# Patient Record
Sex: MALE | Race: BLACK OR AFRICAN AMERICAN
[De-identification: names, ages, dates, MRNs, and addresses within clinical notes are randomized per-mention and may not be internally consistent; named-entity substitution may affect disease eponyms.]

---

## 2019-11-27 ENCOUNTER — HOSPITAL ENCOUNTER (EMERGENCY)
Dept: HOSPITAL 54 - ER | Age: 64
Discharge: HOME | End: 2019-11-27
Payer: COMMERCIAL

## 2019-11-27 VITALS — DIASTOLIC BLOOD PRESSURE: 71 MMHG | SYSTOLIC BLOOD PRESSURE: 129 MMHG

## 2019-11-27 VITALS — BODY MASS INDEX: 25.46 KG/M2 | HEIGHT: 68 IN | WEIGHT: 168 LBS

## 2019-11-27 DIAGNOSIS — Z79.899: ICD-10-CM

## 2019-11-27 DIAGNOSIS — R07.89: Primary | ICD-10-CM

## 2019-11-27 DIAGNOSIS — I10: ICD-10-CM

## 2019-11-27 DIAGNOSIS — E78.5: ICD-10-CM

## 2019-11-27 DIAGNOSIS — R00.1: ICD-10-CM

## 2019-11-27 DIAGNOSIS — Z98.890: ICD-10-CM

## 2019-11-27 LAB
BASOPHILS # BLD AUTO: 0 /CMM (ref 0–0.2)
BASOPHILS NFR BLD AUTO: 0.4 % (ref 0–2)
BUN SERPL-MCNC: 14 MG/DL (ref 7–18)
CALCIUM SERPL-MCNC: 8.4 MG/DL (ref 8.5–10.1)
CHLORIDE SERPL-SCNC: 106 MMOL/L (ref 98–107)
CO2 SERPL-SCNC: 29 MMOL/L (ref 21–32)
CREAT SERPL-MCNC: 1.6 MG/DL (ref 0.6–1.3)
EOSINOPHIL NFR BLD AUTO: 4 % (ref 0–6)
GLUCOSE SERPL-MCNC: 113 MG/DL (ref 74–106)
HCT VFR BLD AUTO: 36 % (ref 39–51)
HGB BLD-MCNC: 11.7 G/DL (ref 13.5–17.5)
LYMPHOCYTES NFR BLD AUTO: 1.4 /CMM (ref 0.8–4.8)
LYMPHOCYTES NFR BLD AUTO: 37.8 % (ref 20–44)
MCHC RBC AUTO-ENTMCNC: 32 G/DL (ref 31–36)
MCV RBC AUTO: 86 FL (ref 80–96)
MONOCYTES NFR BLD AUTO: 0.4 /CMM (ref 0.1–1.3)
MONOCYTES NFR BLD AUTO: 11.9 % (ref 2–12)
NEUTROPHILS # BLD AUTO: 1.7 /CMM (ref 1.8–8.9)
NEUTROPHILS NFR BLD AUTO: 45.9 % (ref 43–81)
PLATELET # BLD AUTO: 167 /CMM (ref 150–450)
POTASSIUM SERPL-SCNC: 3.7 MMOL/L (ref 3.5–5.1)
RBC # BLD AUTO: 4.22 MIL/UL (ref 4.5–6)
SODIUM SERPL-SCNC: 143 MMOL/L (ref 136–145)
WBC NRBC COR # BLD AUTO: 3.7 K/UL (ref 4.3–11)

## 2019-11-27 NOTE — NUR
PT CAME FROM HOME WITH C/O MIDSTERNAL CHEST, FELT A NUMBNESS THAT RADIATE TO 
LEFT ARM THAT STARTED LAST NIGHT AROUND 2100. TOOK 2 81MG ASPIRIN AT HOME PTA. 
AAOX4. NO SOB. BREATHING EVEN AND UNLABORED. NAD. TROPONIN RESULTS NEGATIVE. 
CHEST XRAY TAKEN. AWAITING MD ORDERS.

## 2019-11-27 NOTE — NUR
-------------------------------------------------------------------------------

             *** Note undone in EDM - 11/27/19 at 2020 by MINO ***             

-------------------------------------------------------------------------------

REDRAWN TROPONIN LEVELS AT 2130

## 2019-11-27 NOTE — NUR
ASPIRIN 162MG CANCELLED, PER PATIENT HE TOOK ASA 81MG IN AM AND ANOTHER ASA 
81MG AT 4PM. DR. REED MADE AWARE AND CANCELLED ORDER FOR ASA.

## 2019-11-27 NOTE — NUR
IV removed. Catheter intact and site benign. Pressure and 4x4 applied to site. 
No bleeding noted. Patient discharged to home in stable condition. Written and 
verbal after care instructions given. Patient verbalizes understanding of 
instruction. ambulatory with a steady gait noted. pt aaox4 no acute distress 
noted, resp even and unlabored. pt denies pain or discomfort at this time. pt 
wife at bedside to take tp home.

## 2020-05-16 NOTE — NUR
MIDSTERNAL CHEST DISCOMFORT, LUE NUMBNESS THAT STARTED LAST NIGHT, 2100. 
PATIENT A/OX4, BREATHING EVEN AND UNLABORED, NOS OB NOTED, CHANGED INTO GOWN, 
ATTACHED TO THE CARDIAC MONITOR. NO DISTRESS NOTED. DR REED AT BEDSIDE FOR 
EVAL. Patient's BP reassessed after scheduled BP medication Procardia 30 mg PO capsule given. BP 
is 142/79, HR of 86. Patient asymptomatic.

## 2020-09-25 ENCOUNTER — HOSPITAL ENCOUNTER (INPATIENT)
Dept: HOSPITAL 54 - ER | Age: 65
LOS: 5 days | Discharge: ANOTHER HOSPITAL | DRG: 444 | End: 2020-09-30
Attending: NURSE PRACTITIONER | Admitting: NURSE PRACTITIONER
Payer: COMMERCIAL

## 2020-09-25 VITALS — HEIGHT: 68 IN | BODY MASS INDEX: 25.76 KG/M2 | WEIGHT: 170 LBS

## 2020-09-25 DIAGNOSIS — Z79.899: ICD-10-CM

## 2020-09-25 DIAGNOSIS — E11.9: ICD-10-CM

## 2020-09-25 DIAGNOSIS — D61.818: ICD-10-CM

## 2020-09-25 DIAGNOSIS — N40.0: ICD-10-CM

## 2020-09-25 DIAGNOSIS — E78.5: ICD-10-CM

## 2020-09-25 DIAGNOSIS — Z86.79: ICD-10-CM

## 2020-09-25 DIAGNOSIS — K82.8: ICD-10-CM

## 2020-09-25 DIAGNOSIS — K59.00: ICD-10-CM

## 2020-09-25 DIAGNOSIS — R74.0: ICD-10-CM

## 2020-09-25 DIAGNOSIS — K80.50: Primary | ICD-10-CM

## 2020-09-25 DIAGNOSIS — I10: ICD-10-CM

## 2020-09-25 DIAGNOSIS — K83.8: ICD-10-CM

## 2020-09-25 DIAGNOSIS — Z79.82: ICD-10-CM

## 2020-09-25 DIAGNOSIS — Z98.890: ICD-10-CM

## 2020-09-25 DIAGNOSIS — N17.0: ICD-10-CM

## 2020-09-25 DIAGNOSIS — D69.6: ICD-10-CM

## 2020-09-25 DIAGNOSIS — I25.10: ICD-10-CM

## 2020-09-25 DIAGNOSIS — Z90.411: ICD-10-CM

## 2020-09-25 DIAGNOSIS — I42.9: ICD-10-CM

## 2020-09-25 PROCEDURE — C9113 INJ PANTOPRAZOLE SODIUM, VIA: HCPCS

## 2020-09-25 PROCEDURE — G0378 HOSPITAL OBSERVATION PER HR: HCPCS

## 2020-09-25 NOTE — NUR
12/20/19-Retina High risk of CNVM, isolated change, OCT in 2 months with [facility] if fluid at that time refer back earlier to Dr. Lashawn Arana. PT BIBSELF C/O ABDOMINAL CRAMPING X1 DAY. RECENT COLONOSCOPY X4 DAYS AGO. 
-N/V/D. PT AAOX4,VSS, RESPIRATIONS EVEN AND UNLABORED ON RA W/ NAD NOTED. PT 
CONNECTED TO THE MONITOR AND POX.

## 2020-09-26 VITALS — DIASTOLIC BLOOD PRESSURE: 79 MMHG | SYSTOLIC BLOOD PRESSURE: 123 MMHG

## 2020-09-26 VITALS — SYSTOLIC BLOOD PRESSURE: 142 MMHG | DIASTOLIC BLOOD PRESSURE: 80 MMHG

## 2020-09-26 VITALS — DIASTOLIC BLOOD PRESSURE: 75 MMHG | SYSTOLIC BLOOD PRESSURE: 119 MMHG

## 2020-09-26 VITALS — SYSTOLIC BLOOD PRESSURE: 143 MMHG | DIASTOLIC BLOOD PRESSURE: 103 MMHG

## 2020-09-26 LAB
ALBUMIN SERPL BCP-MCNC: 4 G/DL (ref 3.4–5)
ALP SERPL-CCNC: 139 U/L (ref 46–116)
ALT SERPL W P-5'-P-CCNC: 773 U/L (ref 12–78)
APPEARANCE UR: CLEAR
AST SERPL W P-5'-P-CCNC: 1150 U/L (ref 15–37)
BASOPHILS # BLD AUTO: 0 /CMM (ref 0–0.2)
BASOPHILS NFR BLD AUTO: 0.3 % (ref 0–2)
BILIRUB DIRECT SERPL-MCNC: 0.5 MG/DL (ref 0–0.2)
BILIRUB SERPL-MCNC: 1 MG/DL (ref 0.2–1)
BILIRUB UR QL STRIP: NEGATIVE
BUN SERPL-MCNC: 13 MG/DL (ref 7–18)
CALCIUM SERPL-MCNC: 8.7 MG/DL (ref 8.5–10.1)
CHLORIDE SERPL-SCNC: 102 MMOL/L (ref 98–107)
CO2 SERPL-SCNC: 28 MMOL/L (ref 21–32)
COLOR UR: YELLOW
CREAT SERPL-MCNC: 1.5 MG/DL (ref 0.6–1.3)
EOSINOPHIL NFR BLD AUTO: 1.8 % (ref 0–6)
GLUCOSE SERPL-MCNC: 102 MG/DL (ref 74–106)
GLUCOSE UR STRIP-MCNC: NEGATIVE MG/DL
HCT VFR BLD AUTO: 38 % (ref 39–51)
HGB BLD-MCNC: 12.1 G/DL (ref 13.5–17.5)
HGB UR QL STRIP: NEGATIVE ERY/UL
KETONES UR STRIP-MCNC: NEGATIVE MG/DL
LEUKOCYTE ESTERASE UR QL STRIP: NEGATIVE
LIPASE SERPL-CCNC: 229 U/L (ref 73–393)
LYMPHOCYTES NFR BLD AUTO: 0.3 /CMM (ref 0.8–4.8)
LYMPHOCYTES NFR BLD AUTO: 8.9 % (ref 20–44)
MCHC RBC AUTO-ENTMCNC: 32 G/DL (ref 31–36)
MCV RBC AUTO: 85 FL (ref 80–96)
MONOCYTES NFR BLD AUTO: 0.5 /CMM (ref 0.1–1.3)
MONOCYTES NFR BLD AUTO: 13.1 % (ref 2–12)
NEUTROPHILS # BLD AUTO: 2.7 /CMM (ref 1.8–8.9)
NEUTROPHILS NFR BLD AUTO: 75.9 % (ref 43–81)
NITRITE UR QL STRIP: NEGATIVE
PH UR STRIP: 7 [PH] (ref 5–8)
PLATELET # BLD AUTO: 143 /CMM (ref 150–450)
POTASSIUM SERPL-SCNC: 4.1 MMOL/L (ref 3.5–5.1)
PROT SERPL-MCNC: 7.2 G/DL (ref 6.4–8.2)
PROT UR QL STRIP: NEGATIVE MG/DL
RBC # BLD AUTO: 4.44 MIL/UL (ref 4.5–6)
RBC #/AREA URNS HPF: (no result) /HPF (ref 0–2)
SODIUM SERPL-SCNC: 139 MMOL/L (ref 136–145)
UROBILINOGEN UR STRIP-MCNC: 1 EU/DL
WBC #/AREA URNS HPF: (no result) /HPF (ref 0–3)
WBC NRBC COR # BLD AUTO: 3.6 K/UL (ref 4.3–11)

## 2020-09-26 RX ADMIN — PIPERACILLIN SODIUM AND TAZOBACTAM SODIUM SCH MLS/HR: .375; 3 INJECTION, POWDER, LYOPHILIZED, FOR SOLUTION INTRAVENOUS at 12:17

## 2020-09-26 RX ADMIN — SODIUM CHLORIDE PRN MLS/HR: 4.5 INJECTION, SOLUTION INTRAVENOUS at 03:46

## 2020-09-26 RX ADMIN — Medication PRN MG: at 12:44

## 2020-09-26 RX ADMIN — SODIUM CHLORIDE SCH MG: 9 INJECTION, SOLUTION INTRAVENOUS at 08:46

## 2020-09-26 RX ADMIN — Medication PRN MG: at 04:11

## 2020-09-26 RX ADMIN — SODIUM CHLORIDE PRN MLS/HR: 4.5 INJECTION, SOLUTION INTRAVENOUS at 21:04

## 2020-09-26 RX ADMIN — PIPERACILLIN SODIUM AND TAZOBACTAM SODIUM SCH MLS/HR: .375; 3 INJECTION, POWDER, LYOPHILIZED, FOR SOLUTION INTRAVENOUS at 23:08

## 2020-09-26 RX ADMIN — PIPERACILLIN SODIUM AND TAZOBACTAM SODIUM SCH MLS/HR: .375; 3 INJECTION, POWDER, LYOPHILIZED, FOR SOLUTION INTRAVENOUS at 17:11

## 2020-09-26 RX ADMIN — ENOXAPARIN SODIUM SCH MG: 40 INJECTION SUBCUTANEOUS at 12:21

## 2020-09-26 RX ADMIN — Medication PRN MG: at 23:08

## 2020-09-26 NOTE — NUR
RECEIVE PT FROM [facility] AT 0345 VIA [facility] ADMIT TO TELEMETRY, SR 60'S HR CARDIAC 
MONITORING. PT A/O X 4, TOLERATING ROOM AIR, IN STABLE CONDITION COMPLAIN OF ABDOMINAL PAIN. 
HEAD TO TOE ASSESSMENT IS DONE SKIN IS INTACT. KEPT CLEAN, DRY AND COMFORTABLE. NURSING CARE 
RENDERED, MAINTAINS NPO. SAFETY MEASURES AT ALL TIMES. WILL ENDORSE PLAN OF CARE.

## 2020-09-26 NOTE — NUR
TELE RN AM NOTES

RECEIVED PT A/O X 4, TOLERATING ROOM AIR, DENIES COMPLAIN OF ABDOMINAL PAIN. SKIN IS INTACT. 
KEPT CLEAN, DRY AND COMFORTABLE. AMBULATES AD JOVANI WITH STEADY GAIT.MAINTAINS NPO.FOR GI 
CONSULT. SAFETY MEASURES AT ALL TIMES. CALL LIGHT PLACED WITHIN REACH.

## 2020-09-26 NOTE — NUR
PT RESTING IN BED COMFORTABLY WITH NO S/S OF PAIN OR DISTRESS. REMAINS ON NPO. TALKING ON 
HIS CELL PHONE.SEEN BY DR XAVIER WITH NO NEW ORDER ATH THIS TIME. CALL LIGHT PLACED WITHIN 
REACH.

## 2020-09-26 NOTE — NUR
MS RN



RECEIVE PT IN BED A/O X 3 WATCHING TV DENIES PAIN AND NOT IN DISTRESS SAFETY MEASURES AT ALL 
TIMES, WILL CONT TO MONITOR

## 2020-09-27 VITALS — SYSTOLIC BLOOD PRESSURE: 133 MMHG | DIASTOLIC BLOOD PRESSURE: 75 MMHG

## 2020-09-27 VITALS — SYSTOLIC BLOOD PRESSURE: 147 MMHG | DIASTOLIC BLOOD PRESSURE: 91 MMHG

## 2020-09-27 VITALS — SYSTOLIC BLOOD PRESSURE: 125 MMHG | DIASTOLIC BLOOD PRESSURE: 78 MMHG

## 2020-09-27 LAB
ALBUMIN SERPL BCP-MCNC: 3.4 G/DL (ref 3.4–5)
ALP SERPL-CCNC: 115 U/L (ref 46–116)
ALT SERPL W P-5'-P-CCNC: 380 U/L (ref 12–78)
AST SERPL W P-5'-P-CCNC: 158 U/L (ref 15–37)
BASOPHILS # BLD AUTO: 0 /CMM (ref 0–0.2)
BASOPHILS NFR BLD AUTO: 0.4 % (ref 0–2)
BILIRUB SERPL-MCNC: 0.8 MG/DL (ref 0.2–1)
BUN SERPL-MCNC: 11 MG/DL (ref 7–18)
CALCIUM SERPL-MCNC: 8.6 MG/DL (ref 8.5–10.1)
CHLORIDE SERPL-SCNC: 103 MMOL/L (ref 98–107)
CHOLEST SERPL-MCNC: 168 MG/DL (ref ?–200)
CO2 SERPL-SCNC: 26 MMOL/L (ref 21–32)
CREAT SERPL-MCNC: 1.6 MG/DL (ref 0.6–1.3)
EOSINOPHIL NFR BLD AUTO: 7.4 % (ref 0–6)
EOSINOPHIL NFR BLD MANUAL: 13 % (ref 0–4)
GLUCOSE SERPL-MCNC: 85 MG/DL (ref 74–106)
HCT VFR BLD AUTO: 38 % (ref 39–51)
HDLC SERPL-MCNC: 81 MG/DL (ref 40–60)
HGB BLD-MCNC: 12.4 G/DL (ref 13.5–17.5)
LDLC SERPL DIRECT ASSAY-MCNC: 76 MG/DL (ref 0–99)
LYMPHOCYTES NFR BLD AUTO: 0.9 /CMM (ref 0.8–4.8)
LYMPHOCYTES NFR BLD AUTO: 31 % (ref 20–44)
LYMPHOCYTES NFR BLD MANUAL: 30 % (ref 16–48)
MAGNESIUM SERPL-MCNC: 2.2 MG/DL (ref 1.8–2.4)
MCHC RBC AUTO-ENTMCNC: 32 G/DL (ref 31–36)
MCV RBC AUTO: 85 FL (ref 80–96)
MONOCYTES NFR BLD AUTO: 0.5 /CMM (ref 0.1–1.3)
MONOCYTES NFR BLD AUTO: 17.9 % (ref 2–12)
MONOCYTES NFR BLD MANUAL: 17 % (ref 0–11)
NEUTROPHILS # BLD AUTO: 1.3 /CMM (ref 1.8–8.9)
NEUTROPHILS NFR BLD AUTO: 43.3 % (ref 43–81)
NEUTS SEG NFR BLD MANUAL: 40 % (ref 42–76)
PHOSPHATE SERPL-MCNC: 3.8 MG/DL (ref 2.5–4.9)
PLATELET # BLD AUTO: 145 /CMM (ref 150–450)
POTASSIUM SERPL-SCNC: 4.2 MMOL/L (ref 3.5–5.1)
PROT SERPL-MCNC: 6.5 G/DL (ref 6.4–8.2)
RBC # BLD AUTO: 4.51 MIL/UL (ref 4.5–6)
SODIUM SERPL-SCNC: 140 MMOL/L (ref 136–145)
TRIGL SERPL-MCNC: 74 MG/DL (ref 30–150)
TSH SERPL DL<=0.005 MIU/L-ACNC: 0.98 UIU/ML (ref 0.36–3.74)
WBC NRBC COR # BLD AUTO: 2.9 K/UL (ref 4.3–11)

## 2020-09-27 RX ADMIN — ACETAMINOPHEN PRN MG: 325 TABLET ORAL at 15:10

## 2020-09-27 RX ADMIN — PIPERACILLIN SODIUM AND TAZOBACTAM SODIUM SCH MLS/HR: .375; 3 INJECTION, POWDER, LYOPHILIZED, FOR SOLUTION INTRAVENOUS at 05:31

## 2020-09-27 RX ADMIN — ENOXAPARIN SODIUM SCH MG: 40 INJECTION SUBCUTANEOUS at 09:27

## 2020-09-27 RX ADMIN — PIPERACILLIN SODIUM AND TAZOBACTAM SODIUM SCH MLS/HR: .375; 3 INJECTION, POWDER, LYOPHILIZED, FOR SOLUTION INTRAVENOUS at 23:15

## 2020-09-27 RX ADMIN — PIPERACILLIN SODIUM AND TAZOBACTAM SODIUM SCH MLS/HR: .375; 3 INJECTION, POWDER, LYOPHILIZED, FOR SOLUTION INTRAVENOUS at 17:12

## 2020-09-27 RX ADMIN — SODIUM CHLORIDE SCH MG: 9 INJECTION, SOLUTION INTRAVENOUS at 08:37

## 2020-09-27 RX ADMIN — PIPERACILLIN SODIUM AND TAZOBACTAM SODIUM SCH MLS/HR: .375; 3 INJECTION, POWDER, LYOPHILIZED, FOR SOLUTION INTRAVENOUS at 12:24

## 2020-09-27 RX ADMIN — SODIUM CHLORIDE PRN MLS/HR: 4.5 INJECTION, SOLUTION INTRAVENOUS at 15:27

## 2020-09-27 NOTE — NUR
MS RN NOTES



CALLED RADIOLOGY AND SPOKE TO YU AND STATED THE MRI TECH WILL COME TODAY AROUND 1030 
FOR MRCP, NOTIFIED PT AS WELL.

## 2020-09-27 NOTE — NUR
MS RN NOTES



PT JUST LEFT THE UNIT FOR MRCP PROCEDURE, TRANSPORTED VIA WHEELCHAIR. CONSENT AND CHECKLIST 
WAS SIGNED BY PT.

## 2020-09-27 NOTE — NUR
RN OPENING NOTES



Received patient A/O x4, awake on bed. On RA, no complaints of pain/discomfort at this time. 
With outgoing RN, witnessed patient's consent for ERCP tomorrow. Instructed patient to kept 
on NPO after midnight. Pt verbalized understanding. Kept on bed clean, dry and comfortable. 
Call light within easy reach. On fall precautions, will continue to monitor accordingly.

## 2020-09-27 NOTE — NUR
MS RN



SLEPT WELL, AFEBRILE, MONITORED ACCORDINGLY. NEEDS ATTENDED AND ANTICIPATED, KEPT CLEAN, DRY 
AND COMFORTABLE. AM CARE RENDERED, FOR MRCP WO CONTRAST PT AWARE. NPO MIDNIGHT. SAFETY 
MEASURES AT ALL TIMES. ENDORSE POC.

## 2020-09-27 NOTE — NUR
MS RN NOTES



RECEIVED CALL FROM DR. AGGARWAL FOR CONSULT. MD AWARE PT IS FOR MRCP TODAY. HE ADDED HE'LL 
SEE PT WHEN THE RESULTS COMES BACK. PT MADE AWARE.

## 2020-09-27 NOTE — NUR
MS RN NOTES



PT REMAINS IN BED,AWAKE, A/OX4. PT TOLERATING RA, WITH NO ACUTE RESPIRATORY DISTRESS NOTED. 
PT DENIES ANY PAIN OR DISCOMFORT AT THIS TIME. PT AWARE OF UPCOMING PROCEDURE TOMORROW ERCP 
WITH DR. AGGARWAL [id number], INFORMED PT TO BE NPO POST MIDNIGHT AS WELL. CONSENTS SIGNED BY 
PT DURING ENDORSEMENT. IVF 1/2 NS AT 75ML/HR TO LAC G20, INTACT AND FLUID INFUSING WELL. PT 
KEPT COMFORTABLE.  ALL NEEDS AND CARE ATTENDED. CALL LIGHT KEPT WITHIN REACH. PT'S BED IN 
LOWEST, LOCKED POSITION WITH SRX3. ENDORSED TO INCOMING NIGHT NURSE FOR AVIS.

## 2020-09-27 NOTE — NUR
MS RN NOTES



RECEIVED NEW ORDER FROM DR. AGGARWAL FOR PLAN OF ERCP TOMORROW 1130AM. CHARGE NURSE MADE 
AWARE AS WELL. WILL ENDORSE TO INCOMING NIGHT NURSE AS WELL.

## 2020-09-28 VITALS — SYSTOLIC BLOOD PRESSURE: 123 MMHG | DIASTOLIC BLOOD PRESSURE: 80 MMHG

## 2020-09-28 VITALS — DIASTOLIC BLOOD PRESSURE: 88 MMHG | SYSTOLIC BLOOD PRESSURE: 135 MMHG

## 2020-09-28 VITALS — SYSTOLIC BLOOD PRESSURE: 120 MMHG | DIASTOLIC BLOOD PRESSURE: 90 MMHG

## 2020-09-28 VITALS — SYSTOLIC BLOOD PRESSURE: 138 MMHG | DIASTOLIC BLOOD PRESSURE: 81 MMHG

## 2020-09-28 LAB
ALBUMIN SERPL BCP-MCNC: 3.5 G/DL (ref 3.4–5)
ALP SERPL-CCNC: 106 U/L (ref 46–116)
ALT SERPL W P-5'-P-CCNC: 264 U/L (ref 12–78)
AST SERPL W P-5'-P-CCNC: 70 U/L (ref 15–37)
BASOPHILS # BLD AUTO: 0 /CMM (ref 0–0.2)
BASOPHILS NFR BLD AUTO: 0.5 % (ref 0–2)
BILIRUB SERPL-MCNC: 0.5 MG/DL (ref 0.2–1)
BUN SERPL-MCNC: 10 MG/DL (ref 7–18)
CALCIUM SERPL-MCNC: 8.6 MG/DL (ref 8.5–10.1)
CHLORIDE SERPL-SCNC: 104 MMOL/L (ref 98–107)
CO2 SERPL-SCNC: 28 MMOL/L (ref 21–32)
CREAT SERPL-MCNC: 1.4 MG/DL (ref 0.6–1.3)
EOSINOPHIL NFR BLD AUTO: 7.5 % (ref 0–6)
GLUCOSE SERPL-MCNC: 78 MG/DL (ref 74–106)
HCT VFR BLD AUTO: 37 % (ref 39–51)
HGB BLD-MCNC: 12.1 G/DL (ref 13.5–17.5)
LYMPHOCYTES NFR BLD AUTO: 0.9 /CMM (ref 0.8–4.8)
LYMPHOCYTES NFR BLD AUTO: 29.5 % (ref 20–44)
MAGNESIUM SERPL-MCNC: 2.2 MG/DL (ref 1.8–2.4)
MCHC RBC AUTO-ENTMCNC: 32 G/DL (ref 31–36)
MCV RBC AUTO: 84 FL (ref 80–96)
MONOCYTES NFR BLD AUTO: 0.5 /CMM (ref 0.1–1.3)
MONOCYTES NFR BLD AUTO: 14.9 % (ref 2–12)
NEUTROPHILS # BLD AUTO: 1.5 /CMM (ref 1.8–8.9)
NEUTROPHILS NFR BLD AUTO: 47.6 % (ref 43–81)
PHOSPHATE SERPL-MCNC: 3.6 MG/DL (ref 2.5–4.9)
PLATELET # BLD AUTO: 150 /CMM (ref 150–450)
POTASSIUM SERPL-SCNC: 4.2 MMOL/L (ref 3.5–5.1)
PROT SERPL-MCNC: 6.9 G/DL (ref 6.4–8.2)
RBC # BLD AUTO: 4.43 MIL/UL (ref 4.5–6)
SODIUM SERPL-SCNC: 140 MMOL/L (ref 136–145)
WBC NRBC COR # BLD AUTO: 3.1 K/UL (ref 4.3–11)

## 2020-09-28 PROCEDURE — 0DJ08ZZ INSPECTION OF UPPER INTESTINAL TRACT, VIA NATURAL OR ARTIFICIAL OPENING ENDOSCOPIC: ICD-10-PCS | Performed by: INTERNAL MEDICINE

## 2020-09-28 RX ADMIN — PIPERACILLIN SODIUM AND TAZOBACTAM SODIUM SCH MLS/HR: .375; 3 INJECTION, POWDER, LYOPHILIZED, FOR SOLUTION INTRAVENOUS at 11:15

## 2020-09-28 RX ADMIN — PIPERACILLIN SODIUM AND TAZOBACTAM SODIUM SCH MLS/HR: .375; 3 INJECTION, POWDER, LYOPHILIZED, FOR SOLUTION INTRAVENOUS at 17:05

## 2020-09-28 RX ADMIN — SODIUM CHLORIDE SCH MG: 9 INJECTION, SOLUTION INTRAVENOUS at 08:31

## 2020-09-28 RX ADMIN — ENOXAPARIN SODIUM SCH MG: 40 INJECTION SUBCUTANEOUS at 08:32

## 2020-09-28 RX ADMIN — PIPERACILLIN SODIUM AND TAZOBACTAM SODIUM SCH MLS/HR: .375; 3 INJECTION, POWDER, LYOPHILIZED, FOR SOLUTION INTRAVENOUS at 05:58

## 2020-09-28 RX ADMIN — Medication PRN MG: at 03:41

## 2020-09-28 RX ADMIN — PIPERACILLIN SODIUM AND TAZOBACTAM SODIUM SCH MLS/HR: .375; 3 INJECTION, POWDER, LYOPHILIZED, FOR SOLUTION INTRAVENOUS at 23:21

## 2020-09-28 RX ADMIN — SODIUM CHLORIDE PRN MLS/HR: 4.5 INJECTION, SOLUTION INTRAVENOUS at 06:02

## 2020-09-28 NOTE — NUR
MS RN NOTES



PT REMAINS IN BED, AWAKE, A/OX4. PT TOLERATING RA, WITH NO ACUTE RESPIRATORY DISTRESS NOTED. 
PT DENIES ANY PAIN OR DISCOMFORT AT THIS TIME. PT AWARE OF POSSIBLE TRANSFER TO ANOTHER 
HOSPITAL, CM INVOLVED. IVF 1/2 NS AT 75ML/HR TO LAC G18, INTACT AND FLUID INFUSING WELL. ALL 
NEEDS AND CARE ATTENDED. PT KEPT COMFORTABLE. CALL LIGHT KEPT WITHIN REACH. PT'S BED IN 
LOWEST, LOCKED POSITION WITH SRX3. WILL ENDORSE TO INCOMING NIGHT NURSE FOR AVIS.

## 2020-09-28 NOTE — NUR
MS RN NOTES



RECEIVED PT IN BED, ASLEEP, EASILY AROUSED, A/OX4. PT TOLERATING RA, WITH NO ACUTE 
RESPIRATORY DISTRESS NOTED. PT DENIES ANY PAIN OR DISCOMFORT AT THIS TIME. PT DENIES ANY 
CONCERNS OR QUESTIONS AS WELL AT THIS MOMENT. PT AWARE OF SCHEDULED ERCP TODAY, ALSO AWARE 
OF STAYING NPO UNTIL THE PROCEDURE, AND CONSENTS SIGNED. IVF 1/2 NS AT 75ML/HR TO LAC G20, 
INTACT AND FLUID INFUSING WELL. PT KEPT COMFORTABLE. CALL LIGHT KEPT WITHIN REACH. PT'S BED 
IN LOWEST, LOCKED POSITION WITH SRX3. WILL CONTINUE PLAN OF CARE.

## 2020-09-28 NOTE — NUR
RN CLOSING NOTES



Pt asleep on bed. No new complaints made. Medicated for pain, noted effective. Kept on NPO 
as ordered. All nursing needs attended, due meds given as ordered. Kept on bed clean, dry 
and comfortable. Endorsed.

## 2020-09-28 NOTE — NUR
MS RN NOTES



PT CAME BACK FROM O.R. PER RR RN/MAGO, DR. AGGARWAL UNABLE TO DO ERCP BECAUSE OF ANATOMICAL 
STRUCTURE OF PT WITH HISTORY OF MULTIPLE SURGERIES FROM THE PAST. PLAN TO TRANSFER PT 
SOMEWHERE ELSE, PT MADE AWRE AND CHARGE NURSE MADE AWARE AS WELL. VS STABLE AND RECORDED.

## 2020-09-28 NOTE — NUR
MS RN NOTES



PT TRANSPORTED TO [facility]. VIA BED FOR ERCP PROCEDURE. PT'S ZOSYN SCHEDULED FOR 1200 ENDORSED TO 
RN/MAGO, SHE SAID OKAY TO HANG IT DOWNSTAIRS. MEDICINE SCANNED. VS TAKEN AND RECORDED.

## 2020-09-28 NOTE — NUR
MS RN NOTES



NP/JORDON/ANABEL IN THE UNIT, AWARE OF BUN AND CREA RESULT. HE STATED HE'LL RELAY TO DR. JIMENEZ, BUT ALSO ADDED MIGHT PLAN FOR HIM TO HAVE DIALYSIS. CHARGE NURSE/DIONISIO MADE 
AWARE AS WELL.

-------------------------------------------------------------------------------

Addendum: 09/28/20 at 0939 by SAMIA SHAW RN

-------------------------------------------------------------------------------

WRONG DOCUMENTATION ON THIS PATIENT

## 2020-09-28 NOTE — NUR
MS RN NOTES



SPOKE TO CM/RANDY AND AWARE OF  AND DR. GUNTER'S PLAN OF PT BEING TRANSFERRED TO 
A [facility]. PER RANDY THEY'RE STILL WORKING ON IT. AWAITING FOR CALL BACK AND WILL 
ENDORSE TO INCOMING NIGHT NURSE AS WELL.

## 2020-09-29 VITALS — DIASTOLIC BLOOD PRESSURE: 81 MMHG | SYSTOLIC BLOOD PRESSURE: 121 MMHG

## 2020-09-29 VITALS — SYSTOLIC BLOOD PRESSURE: 149 MMHG | DIASTOLIC BLOOD PRESSURE: 80 MMHG

## 2020-09-29 VITALS — DIASTOLIC BLOOD PRESSURE: 83 MMHG | SYSTOLIC BLOOD PRESSURE: 135 MMHG

## 2020-09-29 RX ADMIN — SODIUM CHLORIDE PRN MLS/HR: 4.5 INJECTION, SOLUTION INTRAVENOUS at 01:49

## 2020-09-29 RX ADMIN — SODIUM CHLORIDE PRN MLS/HR: 4.5 INJECTION, SOLUTION INTRAVENOUS at 20:01

## 2020-09-29 RX ADMIN — PIPERACILLIN SODIUM AND TAZOBACTAM SODIUM SCH MLS/HR: .375; 3 INJECTION, POWDER, LYOPHILIZED, FOR SOLUTION INTRAVENOUS at 05:31

## 2020-09-29 RX ADMIN — PANTOPRAZOLE SODIUM SCH MG: 40 TABLET, DELAYED RELEASE ORAL at 09:23

## 2020-09-29 RX ADMIN — PIPERACILLIN SODIUM AND TAZOBACTAM SODIUM SCH MLS/HR: .375; 3 INJECTION, POWDER, LYOPHILIZED, FOR SOLUTION INTRAVENOUS at 12:33

## 2020-09-29 RX ADMIN — ENOXAPARIN SODIUM SCH MG: 40 INJECTION SUBCUTANEOUS at 09:23

## 2020-09-29 RX ADMIN — PIPERACILLIN SODIUM AND TAZOBACTAM SODIUM SCH MLS/HR: .375; 3 INJECTION, POWDER, LYOPHILIZED, FOR SOLUTION INTRAVENOUS at 19:00

## 2020-09-29 NOTE — NUR
MS RN OPENING NOTE

PATIENT AWAKE IN BED. A/OX4; AMBULATORY AND ABLE TO VERBALIZE NEEDS. ON RA; DENIES SOB; 
BREATHING IS EVEN AND UNLABORED. NO C/O PAIN AT THIS TIME. IV PRESENT ON RIGHT HAND, SIZE 
22, INTACT & PATENT WITH  1/2 NS RUNNING AT 75 ML/HR. SAFETY MEASURES IN PLACE AND PATIENT'S 
NEEDS MET. BED LOCKED, HOB ELEVATED, SIDE RAILS X2, CALL LIGHT WITHIN REACH. WILL CONTINUE 
TO MONITOR.

## 2020-09-29 NOTE — NUR
MS RN CLOSE NOTES





PATIENT IS LAYING IN BED. A/O X4. ON RA, NO SOB/ ACUTE RESPIRATORY DISTRESS NOTED. IV IN L 
AC #18G IS PATENT AND INTACT RUNNING 1/2 NS@ 75 MLS/HR. HAS NO COMPLAINTS OF PAIN AT THE 
MOMENT/ APPEARS COMFORTABLE. PT IS AMBULATORY. BED IS IN LOWEST LOCKED POSITION WITH SIDE 
RAILS UP X2, SEMI FOWLERS. CALL LIGHT IS WITHIN REACH. PT KEPT ON FULL LIQUID DIET. WILL 
ENDORSE TO AM NURSE.

## 2020-09-30 VITALS — SYSTOLIC BLOOD PRESSURE: 138 MMHG | DIASTOLIC BLOOD PRESSURE: 100 MMHG

## 2020-09-30 LAB
ALBUMIN SERPL BCP-MCNC: 3.5 G/DL (ref 3.4–5)
ALP SERPL-CCNC: 102 U/L (ref 46–116)
ALT SERPL W P-5'-P-CCNC: 162 U/L (ref 12–78)
AST SERPL W P-5'-P-CCNC: 68 U/L (ref 15–37)
BASOPHILS # BLD AUTO: 0 /CMM (ref 0–0.2)
BASOPHILS NFR BLD AUTO: 0.6 % (ref 0–2)
BILIRUB SERPL-MCNC: 0.6 MG/DL (ref 0.2–1)
BUN SERPL-MCNC: 6 MG/DL (ref 7–18)
CALCIUM SERPL-MCNC: 8.6 MG/DL (ref 8.5–10.1)
CHLORIDE SERPL-SCNC: 104 MMOL/L (ref 98–107)
CO2 SERPL-SCNC: 28 MMOL/L (ref 21–32)
CREAT SERPL-MCNC: 1.6 MG/DL (ref 0.6–1.3)
EOSINOPHIL NFR BLD AUTO: 9.7 % (ref 0–6)
GLUCOSE SERPL-MCNC: 85 MG/DL (ref 74–106)
HCT VFR BLD AUTO: 36 % (ref 39–51)
HGB BLD-MCNC: 11.8 G/DL (ref 13.5–17.5)
LYMPHOCYTES NFR BLD AUTO: 1 /CMM (ref 0.8–4.8)
LYMPHOCYTES NFR BLD AUTO: 35.9 % (ref 20–44)
MAGNESIUM SERPL-MCNC: 2.3 MG/DL (ref 1.8–2.4)
MCHC RBC AUTO-ENTMCNC: 33 G/DL (ref 31–36)
MCV RBC AUTO: 83 FL (ref 80–96)
MONOCYTES NFR BLD AUTO: 0.4 /CMM (ref 0.1–1.3)
MONOCYTES NFR BLD AUTO: 15.8 % (ref 2–12)
NEUTROPHILS # BLD AUTO: 1 /CMM (ref 1.8–8.9)
NEUTROPHILS NFR BLD AUTO: 38 % (ref 43–81)
PHOSPHATE SERPL-MCNC: 3.4 MG/DL (ref 2.5–4.9)
PLATELET # BLD AUTO: 159 /CMM (ref 150–450)
POTASSIUM SERPL-SCNC: 3.7 MMOL/L (ref 3.5–5.1)
PROT SERPL-MCNC: 6.7 G/DL (ref 6.4–8.2)
RBC # BLD AUTO: 4.35 MIL/UL (ref 4.5–6)
SODIUM SERPL-SCNC: 142 MMOL/L (ref 136–145)
WBC NRBC COR # BLD AUTO: 2.8 K/UL (ref 4.3–11)

## 2020-09-30 RX ADMIN — PIPERACILLIN SODIUM AND TAZOBACTAM SODIUM SCH MLS/HR: .375; 3 INJECTION, POWDER, LYOPHILIZED, FOR SOLUTION INTRAVENOUS at 00:05

## 2020-09-30 RX ADMIN — ACETAMINOPHEN PRN MG: 325 TABLET ORAL at 00:13

## 2020-09-30 RX ADMIN — ENOXAPARIN SODIUM SCH MG: 40 INJECTION SUBCUTANEOUS at 09:15

## 2020-09-30 RX ADMIN — PIPERACILLIN SODIUM AND TAZOBACTAM SODIUM SCH MLS/HR: .375; 3 INJECTION, POWDER, LYOPHILIZED, FOR SOLUTION INTRAVENOUS at 05:49

## 2020-09-30 RX ADMIN — SODIUM CHLORIDE PRN MLS/HR: 4.5 INJECTION, SOLUTION INTRAVENOUS at 10:25

## 2020-09-30 RX ADMIN — PANTOPRAZOLE SODIUM SCH MG: 40 TABLET, DELAYED RELEASE ORAL at 07:41

## 2020-09-30 NOTE — NUR
MS RN CLOSING NOTES

PATIENT SLEEPING, EASY TO AWAKEN. REMAINED STABLE DURING ENTIRE SHIFT. A/OX4 ON RA; NO SOB 
OR C/O PAIN. IV PRESENT ON RIGHT HAND, SIZE 22, INTACT & PATENT WITH  1/2 NS RUNNING AT 75 
ML/HR. SAFETY MEASURES IN PLACE AND PATIENT'S NEEDS MET. BED LOCKED, HOB ELEVATED, SIDE 
RAILS X2, CALL LIGHT WITHIN REACH. WILL ENDORSE TO DAY SHIFT RN PLAN OF CARE.

## 2020-09-30 NOTE — NUR
RN Opening Note



Received patient in bed, AO x 4, able to responds all stimuli. patient does no c/o pain or 
distress, respiratory even and unlabored on room air O2sat 97%. Skin is warm to touch, keep 
clean.dry, intact IV  site on right hand G20, running 1/2 NS at 75 ml/hr. Kept bed in locked 
with elevated HOB for ensure airway and aspiration precaution, and remain lower position of 
bed for safety. Will continue to monitor.

## 2020-09-30 NOTE — NUR
Patient transfer to [facility], given report Olya MONTANO. 2EMTs picked up patient, pt in 
stable condition & vital sign and given all documents include DC.

## 2021-02-05 NOTE — NUR
MS RN NOTES



RECEIVED PT IN BED, ASLEEP, EASILY AROUSED, A/OX4. PT TOLERATING RA, WITH NO ACUTE 
RESPIRATORY DISTRESS NOTED. PT DENIES ANY PAIN OR DISCOMFORT AT THIS TIME. PT DENIES ANY 
CONCERNS OR QUESTIONS AS WELL AT THIS MOMENT. PT AWARE OF SCHEDULED MRCP TODAY, ALSO AWARE 
OF STAYING NPO UNTIL THE PROCEDURE, AND CONSENT TO BE SIGN BEFORE PROCEDURE. IVF 1/2 NS AT 
75ML/HR TO LAC G20, INTACT AND FLUID INFUSING WELL. PT KEPT COMFORTABLE. CALL LIGHT KEPT 
WITHIN REACH. PT'S BED IN LOWEST, LOCKED POSITION WITH SRX3. WILL CONTINUE PLAN OF CARE. Negative